# Patient Record
Sex: FEMALE | Race: ASIAN | NOT HISPANIC OR LATINO | Employment: UNEMPLOYED | URBAN - METROPOLITAN AREA
[De-identification: names, ages, dates, MRNs, and addresses within clinical notes are randomized per-mention and may not be internally consistent; named-entity substitution may affect disease eponyms.]

---

## 2017-03-07 PROBLEM — Z34.00 SUPERVISION OF NORMAL FIRST PREGNANCY: Status: ACTIVE | Noted: 2017-03-07

## 2017-04-05 PROBLEM — Z37.9 NORMAL LABOR: Status: ACTIVE | Noted: 2017-04-05

## 2018-04-17 PROBLEM — A60.00 GENITAL HERPES: Status: ACTIVE | Noted: 2018-04-17

## 2018-04-17 PROBLEM — Z37.9 NORMAL LABOR: Status: RESOLVED | Noted: 2017-04-05 | Resolved: 2018-04-17

## 2018-04-17 PROBLEM — Z34.00 SUPERVISION OF NORMAL FIRST PREGNANCY: Status: RESOLVED | Noted: 2017-03-07 | Resolved: 2018-04-17

## 2023-03-20 ENCOUNTER — OFFICE VISIT (OUTPATIENT)
Dept: CARDIOLOGY | Facility: MEDICAL CENTER | Age: 38
End: 2023-03-20
Payer: COMMERCIAL

## 2023-03-20 VITALS
DIASTOLIC BLOOD PRESSURE: 68 MMHG | RESPIRATION RATE: 14 BRPM | OXYGEN SATURATION: 94 % | SYSTOLIC BLOOD PRESSURE: 110 MMHG | WEIGHT: 120 LBS | HEIGHT: 65 IN | HEART RATE: 73 BPM | BODY MASS INDEX: 19.99 KG/M2

## 2023-03-20 DIAGNOSIS — E78.5 HYPERLIPIDEMIA, UNSPECIFIED HYPERLIPIDEMIA TYPE: ICD-10-CM

## 2023-03-20 LAB — EKG IMPRESSION: NORMAL

## 2023-03-20 PROCEDURE — 93000 ELECTROCARDIOGRAM COMPLETE: CPT | Performed by: INTERNAL MEDICINE

## 2023-03-20 PROCEDURE — 99204 OFFICE O/P NEW MOD 45 MIN: CPT | Performed by: INTERNAL MEDICINE

## 2023-03-20 RX ORDER — ROSUVASTATIN CALCIUM 5 MG/1
5 TABLET, COATED ORAL EVERY EVENING
Qty: 100 TABLET | Refills: 4 | Status: SHIPPED | OUTPATIENT
Start: 2023-03-20 | End: 2023-07-18

## 2023-03-20 ASSESSMENT — ENCOUNTER SYMPTOMS
DIZZINESS: 0
BLURRED VISION: 0
MYALGIAS: 0
CLAUDICATION: 0
DEPRESSION: 0
BRUISES/BLEEDS EASILY: 0
COUGH: 0
SHORTNESS OF BREATH: 0
ABDOMINAL PAIN: 0
VOMITING: 0
NAUSEA: 0
EYE PAIN: 0
HALLUCINATIONS: 0
PND: 0
DOUBLE VISION: 0
LOSS OF CONSCIOUSNESS: 0
EYE DISCHARGE: 0
HEADACHES: 0
FALLS: 0
WEIGHT LOSS: 0
CHILLS: 0
PALPITATIONS: 0
ORTHOPNEA: 0
SENSORY CHANGE: 0
SPEECH CHANGE: 0
BLOOD IN STOOL: 0
FEVER: 0

## 2023-03-20 NOTE — PROGRESS NOTES
Chief Complaint   Patient presents with    Hyperlipidemia       Subjective     Kaitlin Land is a 37 y.o. female who presents today for cardiac care and management due to hyperlipidemia with elevated LDL.  Patient does have strong family history of heart disease especially her father.  She and her  are greatly concerned for heart disease.    Kaitlin Land does not have any history of heart attack arrhythmias in the past. she never had transthoracic echocardiogram, cardiac catheterization or ablations procedure in the past. At this time, she denies having chest pain shortness of breath presyncopal syncopal episodes. she is able to exercise with walking for one to 2 miles without having problems of chest pain or shortness of breath. Patient is also able to climb up at least 2 flights of stairs without having symptoms.    I have personally interpreted EKG today with patient, there is no evidence of acute coronary syndrome, no evidence of prior infarct, normal MI and QT interval, no significant conduction disease. Sinus rhythm.      Past Medical History:   Diagnosis Date    HSV-2 (herpes simplex virus 2) infection     culture positive past.      History reviewed. No pertinent surgical history.  History reviewed. No pertinent family history.  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 100 Tablet 4    Cholecalciferol (VITAMIN D PO) Take  by mouth.      Omega-3 Fatty Acids (FISH OIL PO) Take  by mouth.       No facility-administered encounter medications on file as of 3/20/2023.     Review of Systems   Constitutional:  Negative for chills, fever, malaise/fatigue and weight loss.   HENT:  Negative for ear discharge, ear pain, hearing loss and nosebleeds.    Eyes:  Negative for blurred vision, double vision, pain and discharge.   Respiratory:  Negative for cough and shortness of breath.    Cardiovascular:  Negative for chest pain, palpitations, orthopnea, claudication, leg swelling and PND.   Gastrointestinal:  Negative for abdominal pain, blood in stool, melena, nausea and vomiting.   Genitourinary:  Negative for dysuria and hematuria.   Musculoskeletal:  Negative for falls, joint pain and myalgias.   Skin:  Negative for itching and rash.   Neurological:  Negative for dizziness, sensory change, speech change, loss of consciousness and headaches.   Endo/Heme/Allergies:  Negative for environmental allergies. Does not bruise/bleed easily.   Psychiatric/Behavioral:  Negative for depression, hallucinations and suicidal ideas.             Objective     /68 (BP Location: Left arm, Patient Position: Sitting, BP Cuff Size: Adult)   Pulse 73   Resp 14   Ht 1.651 m (5' 5\")   Wt 54.4 kg (120 lb)   SpO2 94%   BMI 19.97 kg/m²     Physical Exam  Vitals and nursing note reviewed.   Constitutional:       General: She is not in acute distress.     Appearance: She is not diaphoretic.   HENT:      Head: Normocephalic and atraumatic.      Right Ear: External ear normal.    "   Left Ear: External ear normal.      Nose: No congestion or rhinorrhea.   Eyes:      General:         Right eye: No discharge.         Left eye: No discharge.   Neck:      Thyroid: No thyromegaly.      Vascular: No JVD.   Cardiovascular:      Rate and Rhythm: Normal rate and regular rhythm.      Pulses: Normal pulses.   Pulmonary:      Effort: No respiratory distress.   Abdominal:      General: There is no distension.      Tenderness: There is no abdominal tenderness.   Musculoskeletal:         General: No swelling or tenderness.      Right lower leg: No edema.      Left lower leg: No edema.   Skin:     General: Skin is warm and dry.   Neurological:      Mental Status: She is alert and oriented to person, place, and time.      Cranial Nerves: No cranial nerve deficit.   Psychiatric:         Behavior: Behavior normal.              Assessment & Plan     1. Hyperlipidemia, unspecified hyperlipidemia type  EKG    rosuvastatin (CRESTOR) 5 MG Tab    Basic Metabolic Panel    LIPID PANEL    Lipoprotein (a)          Medical Decision Making: Today's Assessment/Status/Plan:   Trial of Rosuvastatin 5 mg daily.

## 2023-07-14 ENCOUNTER — HOSPITAL ENCOUNTER (OUTPATIENT)
Dept: LAB | Facility: MEDICAL CENTER | Age: 38
End: 2023-07-14
Attending: INTERNAL MEDICINE
Payer: COMMERCIAL

## 2023-07-14 DIAGNOSIS — E78.5 HYPERLIPIDEMIA, UNSPECIFIED HYPERLIPIDEMIA TYPE: ICD-10-CM

## 2023-07-14 LAB
ANION GAP SERPL CALC-SCNC: 9 MMOL/L (ref 7–16)
BUN SERPL-MCNC: 11 MG/DL (ref 8–22)
CALCIUM SERPL-MCNC: 9.4 MG/DL (ref 8.5–10.5)
CHLORIDE SERPL-SCNC: 104 MMOL/L (ref 96–112)
CHOLEST SERPL-MCNC: 175 MG/DL (ref 100–199)
CO2 SERPL-SCNC: 25 MMOL/L (ref 20–33)
CREAT SERPL-MCNC: 0.76 MG/DL (ref 0.5–1.4)
GFR SERPLBLD CREATININE-BSD FMLA CKD-EPI: 104 ML/MIN/1.73 M 2
GLUCOSE SERPL-MCNC: 88 MG/DL (ref 65–99)
HDLC SERPL-MCNC: 89 MG/DL
LDLC SERPL CALC-MCNC: 76 MG/DL
POTASSIUM SERPL-SCNC: 4.1 MMOL/L (ref 3.6–5.5)
SODIUM SERPL-SCNC: 138 MMOL/L (ref 135–145)
TRIGL SERPL-MCNC: 51 MG/DL (ref 0–149)

## 2023-07-14 PROCEDURE — 80061 LIPID PANEL: CPT

## 2023-07-14 PROCEDURE — 36415 COLL VENOUS BLD VENIPUNCTURE: CPT

## 2023-07-14 PROCEDURE — 80048 BASIC METABOLIC PNL TOTAL CA: CPT

## 2023-07-14 PROCEDURE — 83695 ASSAY OF LIPOPROTEIN(A): CPT

## 2023-07-16 LAB — LPA SERPL-MCNC: 92 MG/DL

## 2023-07-18 ENCOUNTER — TELEPHONE (OUTPATIENT)
Dept: CARDIOLOGY | Facility: MEDICAL CENTER | Age: 38
End: 2023-07-18
Payer: COMMERCIAL

## 2023-07-18 DIAGNOSIS — E78.5 HYPERLIPIDEMIA, UNSPECIFIED HYPERLIPIDEMIA TYPE: ICD-10-CM

## 2023-07-18 PROCEDURE — 99441 PR PHYSICIAN TELEPHONE EVALUATION 5-10 MIN: CPT | Performed by: INTERNAL MEDICINE

## 2023-07-18 RX ORDER — ROSUVASTATIN CALCIUM 10 MG/1
10 TABLET, COATED ORAL EVERY EVENING
Qty: 100 TABLET | Refills: 4 | Status: SHIPPED | OUTPATIENT
Start: 2023-07-18 | End: 2023-08-14

## 2023-07-18 NOTE — TELEPHONE ENCOUNTER
Telephone Appointment Visit    This telephone visit was initiated by the patient and they verbally consented. Patient is home [facility].    Reason for Call:   Would like to increase her Rosuvastatin to 10 mg daily.    HPI:    Kaitlin Land is a 37 y.o. female who presents today for cardiac care and management due to hyperlipidemia with elevated LDL.  Patient does have strong family history of heart disease especially her father.  She and her  are greatly concerned for heart disease.     Labs / Images Reviewed:   LDL is now 76, trig of 51.     Assessment and Plan:     1. Hyperlipidemia, unspecified hyperlipidemia type    Ok to increase Rosuvastatin to 10 mg daily for LDL goal of less than 50.    Follow-up: as scheduled.    Total Time Spent (mins): 5.    Joe Ozuna M.D.

## 2023-08-14 ENCOUNTER — OFFICE VISIT (OUTPATIENT)
Dept: CARDIOLOGY | Facility: MEDICAL CENTER | Age: 38
End: 2023-08-14
Attending: INTERNAL MEDICINE
Payer: COMMERCIAL

## 2023-08-14 VITALS
HEIGHT: 65 IN | OXYGEN SATURATION: 96 % | HEART RATE: 80 BPM | BODY MASS INDEX: 19.39 KG/M2 | RESPIRATION RATE: 16 BRPM | SYSTOLIC BLOOD PRESSURE: 96 MMHG | WEIGHT: 116.4 LBS | DIASTOLIC BLOOD PRESSURE: 52 MMHG

## 2023-08-14 DIAGNOSIS — E78.5 HYPERLIPIDEMIA, UNSPECIFIED HYPERLIPIDEMIA TYPE: ICD-10-CM

## 2023-08-14 DIAGNOSIS — R01.1 UNDIAGNOSED CARDIAC MURMURS: ICD-10-CM

## 2023-08-14 PROCEDURE — 99214 OFFICE O/P EST MOD 30 MIN: CPT | Performed by: INTERNAL MEDICINE

## 2023-08-14 PROCEDURE — 3078F DIAST BP <80 MM HG: CPT | Performed by: INTERNAL MEDICINE

## 2023-08-14 PROCEDURE — 3074F SYST BP LT 130 MM HG: CPT | Performed by: INTERNAL MEDICINE

## 2023-08-14 PROCEDURE — 99211 OFF/OP EST MAY X REQ PHY/QHP: CPT | Performed by: INTERNAL MEDICINE

## 2023-08-14 RX ORDER — ROSUVASTATIN CALCIUM 20 MG/1
20 TABLET, COATED ORAL EVERY EVENING
Qty: 30 TABLET | Refills: 11 | Status: SHIPPED | OUTPATIENT
Start: 2023-08-14 | End: 2023-10-19

## 2023-08-14 ASSESSMENT — ENCOUNTER SYMPTOMS
SENSORY CHANGE: 0
HEADACHES: 0
FEVER: 0
EYE PAIN: 0
HALLUCINATIONS: 0
BRUISES/BLEEDS EASILY: 0
SPEECH CHANGE: 0
COUGH: 0
DIZZINESS: 0
FALLS: 0
CHILLS: 0
CLAUDICATION: 0
WEIGHT LOSS: 0
BLOOD IN STOOL: 0
EYE DISCHARGE: 0
LOSS OF CONSCIOUSNESS: 0
ORTHOPNEA: 0
BLURRED VISION: 0
DOUBLE VISION: 0
NAUSEA: 0
PND: 0
MYALGIAS: 0
VOMITING: 0
SHORTNESS OF BREATH: 0
ABDOMINAL PAIN: 0
DEPRESSION: 0
PALPITATIONS: 0

## 2023-08-14 NOTE — PROGRESS NOTES
Chief Complaint   Patient presents with    Hyperlipidemia     F/V Dx: Hyperlipidemia, unspecified hyperlipidemia type       Subjective     Kaitlin Land is a 37 y.o. female who presents today for cardiac care and management due to hyperlipidemia with elevated LDL.  Patient does have strong family history of heart disease especially her father.  She and her  are greatly concerned for heart disease.    Kaitlin Land does not have any history of heart attack arrhythmias in the past. she never had transthoracic echocardiogram, cardiac catheterization or ablations procedure in the past. At this time, she denies having chest pain shortness of breath presyncopal syncopal episodes. she is able to exercise with walking for one to 2 miles without having problems of chest pain or shortness of breath. Patient is also able to climb up at least 2 flights of stairs without having symptoms.    I have personally interpreted EKG today with patient, there is no evidence of acute coronary syndrome, no evidence of prior infarct, normal VA and QT interval, no significant conduction disease. Sinus rhythm.    I have independently interpreted and reviewed blood tests results with patient in clinic which shows LDL level of 76, triglycerides level of 51, GFR of 104, K of 4.1. Lpa of 92.      Past Medical History:   Diagnosis Date    HSV-2 (herpes simplex virus 2) infection     culture positive past.      History reviewed. No pertinent surgical history.  History reviewed. No pertinent family history.  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      Cholecalciferol (VITAMIN D PO) Take  by mouth.      Omega-3 Fatty Acids (FISH OIL PO) Take  by mouth.      [DISCONTINUED] rosuvastatin (CRESTOR) 10 MG Tab Take 1 Tablet by mouth every evening. 100 Tablet 4     No facility-administered encounter medications on file as of 8/14/2023.     Review of Systems   Constitutional:  Negative for chills, fever, malaise/fatigue and weight loss.   HENT:  Negative for ear discharge, ear pain, hearing loss and nosebleeds.    Eyes:  Negative for blurred vision, double vision, pain and discharge.   Respiratory:  Negative for cough and shortness of breath.    Cardiovascular:  Negative for chest pain, palpitations, orthopnea, claudication, leg swelling and PND.   Gastrointestinal:  Negative for abdominal pain, blood in stool, melena, nausea and vomiting.   Genitourinary:  Negative for dysuria and hematuria.   Musculoskeletal:  Negative for falls, joint pain and myalgias.   Skin:  Negative for itching and rash.   Neurological:  Negative for dizziness, sensory change, speech change, loss of consciousness and headaches.   Endo/Heme/Allergies:  Negative for environmental allergies. Does not bruise/bleed easily.   Psychiatric/Behavioral:  Negative for depression, hallucinations and suicidal ideas.               Objective     BP 96/52 (BP Location: Left arm, Patient Position: Sitting, BP Cuff Size: Adult)   Pulse 80   Resp 16   Ht 1.651 m (5'  "5\")   Wt 52.8 kg (116 lb 6.4 oz)   SpO2 96%   BMI 19.37 kg/m²     Physical Exam  Vitals and nursing note reviewed.   Constitutional:       General: She is not in acute distress.     Appearance: She is not diaphoretic.   HENT:      Head: Normocephalic and atraumatic.      Right Ear: External ear normal.      Left Ear: External ear normal.      Nose: No congestion or rhinorrhea.   Eyes:      General:         Right eye: No discharge.         Left eye: No discharge.   Neck:      Thyroid: No thyromegaly.      Vascular: No JVD.   Cardiovascular:      Rate and Rhythm: Normal rate and regular rhythm.      Pulses: Normal pulses.   Pulmonary:      Effort: No respiratory distress.   Abdominal:      General: There is no distension.      Tenderness: There is no abdominal tenderness.   Musculoskeletal:         General: No swelling or tenderness.      Right lower leg: No edema.      Left lower leg: No edema.   Skin:     General: Skin is warm and dry.   Neurological:      Mental Status: She is alert and oriented to person, place, and time.      Cranial Nerves: No cranial nerve deficit.   Psychiatric:         Behavior: Behavior normal.                Assessment & Plan     1. Hyperlipidemia, unspecified hyperlipidemia type        2. Undiagnosed cardiac murmurs            Medical Decision Making: Today's Assessment/Status/Plan:   Will increase Rosuvastatin to 20 and then to 40 mg daily.    Joe Ozuna M.D.                       "

## 2023-10-09 ENCOUNTER — HOSPITAL ENCOUNTER (OUTPATIENT)
Dept: CARDIOLOGY | Facility: MEDICAL CENTER | Age: 38
End: 2023-10-09
Attending: INTERNAL MEDICINE
Payer: COMMERCIAL

## 2023-10-09 DIAGNOSIS — R01.1 UNDIAGNOSED CARDIAC MURMURS: ICD-10-CM

## 2023-10-09 PROCEDURE — 93306 TTE W/DOPPLER COMPLETE: CPT

## 2023-10-10 ENCOUNTER — PATIENT MESSAGE (OUTPATIENT)
Dept: CARDIOLOGY | Facility: MEDICAL CENTER | Age: 38
End: 2023-10-10
Payer: COMMERCIAL

## 2023-10-10 LAB
LV EJECT FRACT  99904: 65
LV EJECT FRACT MOD 2C 99903: 64.58
LV EJECT FRACT MOD 4C 99902: 68.14
LV EJECT FRACT MOD BP 99901: 65.71

## 2023-10-10 PROCEDURE — 93306 TTE W/DOPPLER COMPLETE: CPT | Mod: 26 | Performed by: INTERNAL MEDICINE

## 2023-10-10 NOTE — PATIENT COMMUNICATION
To TT: Patient sent MyChart requesting to further increase her cholesterol medications although her LDL has decreased to 52. Please review and advise what to tell the patient. Thank you!

## 2023-10-19 ENCOUNTER — TELEPHONE (OUTPATIENT)
Dept: CARDIOLOGY | Facility: MEDICAL CENTER | Age: 38
End: 2023-10-19
Payer: COMMERCIAL

## 2023-10-19 DIAGNOSIS — E78.5 HYPERLIPIDEMIA, UNSPECIFIED HYPERLIPIDEMIA TYPE: ICD-10-CM

## 2023-10-19 RX ORDER — ROSUVASTATIN CALCIUM 40 MG/1
40 TABLET, COATED ORAL EVERY EVENING
Qty: 90 TABLET | Refills: 3 | Status: SHIPPED | OUTPATIENT
Start: 2023-10-19 | End: 2023-11-09 | Stop reason: SDUPTHER

## 2023-10-19 RX ORDER — EZETIMIBE 10 MG/1
10 TABLET ORAL DAILY
Qty: 90 TABLET | Refills: 3 | Status: SHIPPED | OUTPATIENT
Start: 2023-10-19

## 2023-10-19 NOTE — TELEPHONE ENCOUNTER
Joe Ozuna M.D.  You 9 minutes ago (11:06 AM)     Ok to do so.     Joe Ozuna M.D.    -----------------------------------------------------------------------------------------    Rx's sent.

## 2023-10-19 NOTE — TELEPHONE ENCOUNTER
MEDICATION MANAGEMENT : TT    Caller: Kaitlin Land    Topic/issue: MEDICATION MANAGEMENT    Kaitlin is requesting an increase in dosage for the ROSUVASTATIN and she would also like to start taking the medication ZETIA 10 MG.    Medication change:  ROSUVASTATIN 20 MG - ROSUVASTATIN 40 MG    New Medication:  ZETIA 10 MG    Pharmacy:  [facility] - [place], [address]    Please advise.    Thank you,  Nishant JENKINS    Callback Number: [phone] (home)

## 2023-10-19 NOTE — TELEPHONE ENCOUNTER
------------------------------------------------------------------------------------    To TT, please advise

## 2023-10-25 ENCOUNTER — TELEPHONE (OUTPATIENT)
Dept: CARDIOLOGY | Facility: MEDICAL CENTER | Age: 38
End: 2023-10-25
Payer: COMMERCIAL

## 2023-10-25 NOTE — TELEPHONE ENCOUNTER
PA sent to zeina Land Key: [id number] - PA Case ID: [id number] help? Call us at [phone]  Status  Sent to [employer]  Drug  Ezetimibe 10MG tablets  Form  [place] [employer] PA Form (2017 [employer])

## 2023-10-25 NOTE — TELEPHONE ENCOUNTER
PA started.    Kaitlin Land Mena: [id number] help? Call us at [phone]  Status  New(Not sent to plan)  Drug  Ezetimibe 10MG tablets  Form  [place] [employer] PA Form (2017 NCP[employer])

## 2023-10-25 NOTE — TELEPHONE ENCOUNTER
TT    Caller: Kaitlin Land     Topic/issue: Pt is asking for us to reach out to her Pharmacy. They are not able to fill her  ezetimibe (ZETIA) 10 MG Tab [[id number]  Stating there is an issue with the insurance. She is wanting some information and what needs to be done to have this medication filled.     Callback Number: [phone] (home)      Thank You    Fany RODRIGUEZ

## 2023-10-25 NOTE — TELEPHONE ENCOUNTER
PA approved through 10/24/2024.    Kaitlin Land Key: [id number] - PA Case ID: [id number] help? Call us at [phone]  Outcome  Approvedtoday  PA Case: [id number], Status: Approved, Coverage Starts on: 10/25/2023 12:00:00 AM, Coverage Ends on: 10/24/2024 12:00:00 AM.  Drug  Ezetimibe 10MG tablets  Form  [place] [employer] PA Form (2017 N[employer])

## 2023-11-09 ENCOUNTER — TELEPHONE (OUTPATIENT)
Dept: CARDIOLOGY | Facility: MEDICAL CENTER | Age: 38
End: 2023-11-09
Payer: COMMERCIAL

## 2023-11-09 DIAGNOSIS — E78.5 HYPERLIPIDEMIA, UNSPECIFIED HYPERLIPIDEMIA TYPE: ICD-10-CM

## 2023-11-09 PROCEDURE — 99441 PR PHYSICIAN TELEPHONE EVALUATION 5-10 MIN: CPT | Performed by: INTERNAL MEDICINE

## 2023-11-09 RX ORDER — ROSUVASTATIN CALCIUM 40 MG/1
40 TABLET, COATED ORAL EVERY EVENING
Qty: 100 TABLET | Refills: 4 | Status: SHIPPED | OUTPATIENT
Start: 2023-11-09

## 2023-11-10 NOTE — TELEPHONE ENCOUNTER
Telephone Appointment Visit    This telephone visit was initiated by the patient and they verbally consented. Patient is in home [facility].      Reason for Call:  Medication Follow-up    HPI:    Patient wants to increase her cholesterol medication.     Labs / Images Reviewed:   I have independently interpreted and reviewed blood tests results with patient in clinic which shows LDL level of 52, triglycerides level of 60, GFR of 104, K of 4.7.       Assessment and Plan:     1. Hyperlipidemia, unspecified hyperlipidemia type    Continue Rosuvastatin 40 mg daily.    Follow-up: as is.    Total Time Spent (mins): 5.    Joe Ozuna M.D.

## 2024-07-03 ENCOUNTER — HOSPITAL ENCOUNTER (OUTPATIENT)
Dept: RADIOLOGY | Facility: MEDICAL CENTER | Age: 39
End: 2024-07-03
Attending: INTERNAL MEDICINE
Payer: COMMERCIAL

## 2024-07-03 DIAGNOSIS — E78.41 ELEVATED LIPOPROTEIN A LEVEL: ICD-10-CM

## 2024-07-03 PROCEDURE — 93880 EXTRACRANIAL BILAT STUDY: CPT | Mod: 26 | Performed by: INTERNAL MEDICINE

## 2024-07-03 PROCEDURE — 93880 EXTRACRANIAL BILAT STUDY: CPT

## 2024-09-17 ENCOUNTER — HOSPITAL ENCOUNTER (OUTPATIENT)
Dept: RADIOLOGY | Facility: MEDICAL CENTER | Age: 39
End: 2024-09-17
Attending: DERMATOLOGY
Payer: COMMERCIAL

## 2024-09-17 DIAGNOSIS — I25.10 DISEASE OF CARDIOVASCULAR SYSTEM: ICD-10-CM

## 2024-09-17 PROCEDURE — 4410556 CT-CARDIAC SCORING (SELF PAY ONLY)

## 2024-10-07 ENCOUNTER — TELEPHONE (OUTPATIENT)
Dept: CARDIOLOGY | Facility: MEDICAL CENTER | Age: 39
End: 2024-10-07
Payer: COMMERCIAL

## 2025-05-13 ENCOUNTER — HOSPITAL ENCOUNTER (OUTPATIENT)
Dept: RADIOLOGY | Facility: MEDICAL CENTER | Age: 40
End: 2025-05-13
Attending: INTERNAL MEDICINE
Payer: COMMERCIAL

## 2025-05-13 DIAGNOSIS — R93.89 ABNORMAL CT SCAN: ICD-10-CM

## 2025-05-13 DIAGNOSIS — R10.9 STOMACH ACHE: ICD-10-CM

## 2025-05-13 PROCEDURE — 71250 CT THORAX DX C-: CPT

## 2025-05-13 PROCEDURE — 700117 HCHG RX CONTRAST REV CODE 255: Performed by: INTERNAL MEDICINE

## 2025-05-13 PROCEDURE — 74177 CT ABD & PELVIS W/CONTRAST: CPT

## 2025-05-13 RX ADMIN — IOHEXOL 100 ML: 350 INJECTION, SOLUTION INTRAVENOUS at 15:15
